# Patient Record
Sex: MALE | Race: WHITE | HISPANIC OR LATINO | URBAN - METROPOLITAN AREA
[De-identification: names, ages, dates, MRNs, and addresses within clinical notes are randomized per-mention and may not be internally consistent; named-entity substitution may affect disease eponyms.]

---

## 2023-08-14 ENCOUNTER — HOSPITAL ENCOUNTER (EMERGENCY)
Facility: HOSPITAL | Age: 33
Discharge: HOME | End: 2023-08-14
Attending: EMERGENCY MEDICINE | Admitting: EMERGENCY MEDICINE

## 2023-08-14 ENCOUNTER — APPOINTMENT (EMERGENCY)
Dept: RADIOLOGY | Facility: HOSPITAL | Age: 33
End: 2023-08-14

## 2023-08-14 VITALS
HEART RATE: 62 BPM | TEMPERATURE: 97.4 F | OXYGEN SATURATION: 98 % | RESPIRATION RATE: 18 BRPM | DIASTOLIC BLOOD PRESSURE: 90 MMHG | SYSTOLIC BLOOD PRESSURE: 144 MMHG

## 2023-08-14 DIAGNOSIS — R07.89 CHEST WALL PAIN: ICD-10-CM

## 2023-08-14 DIAGNOSIS — G56.22 CUBITAL TUNNEL SYNDROME ON LEFT: Primary | ICD-10-CM

## 2023-08-14 LAB
ALBUMIN SERPL BCP-MCNC: 3.7 G/DL (ref 3.5–5)
ALP SERPL-CCNC: 105 U/L (ref 46–116)
ALT SERPL W P-5'-P-CCNC: 10 U/L (ref 12–78)
ANION GAP SERPL CALCULATED.3IONS-SCNC: 7 MMOL/L
AST SERPL W P-5'-P-CCNC: 16 U/L (ref 5–45)
BASOPHILS # BLD AUTO: 0.05 THOUSANDS/ÂΜL (ref 0–0.1)
BASOPHILS NFR BLD AUTO: 1 % (ref 0–1)
BILIRUB SERPL-MCNC: 0.76 MG/DL (ref 0.2–1)
BUN SERPL-MCNC: 12 MG/DL (ref 5–25)
CALCIUM SERPL-MCNC: 9.8 MG/DL (ref 8.3–10.1)
CARDIAC TROPONIN I PNL SERPL HS: <2 NG/L
CHLORIDE SERPL-SCNC: 104 MMOL/L (ref 96–108)
CO2 SERPL-SCNC: 28 MMOL/L (ref 21–32)
CREAT SERPL-MCNC: 0.98 MG/DL (ref 0.6–1.3)
EOSINOPHIL # BLD AUTO: 0.15 THOUSAND/ÂΜL (ref 0–0.61)
EOSINOPHIL NFR BLD AUTO: 2 % (ref 0–6)
ERYTHROCYTE [DISTWIDTH] IN BLOOD BY AUTOMATED COUNT: 12.6 % (ref 11.6–15.1)
GFR SERPL CREATININE-BSD FRML MDRD: 100 ML/MIN/1.73SQ M
GLUCOSE SERPL-MCNC: 142 MG/DL (ref 65–140)
HCT VFR BLD AUTO: 49 % (ref 36.5–49.3)
HGB BLD-MCNC: 16.3 G/DL (ref 12–17)
IMM GRANULOCYTES # BLD AUTO: 0.04 THOUSAND/UL (ref 0–0.2)
IMM GRANULOCYTES NFR BLD AUTO: 0 % (ref 0–2)
LYMPHOCYTES # BLD AUTO: 2.23 THOUSANDS/ÂΜL (ref 0.6–4.47)
LYMPHOCYTES NFR BLD AUTO: 23 % (ref 14–44)
MCH RBC QN AUTO: 29.8 PG (ref 26.8–34.3)
MCHC RBC AUTO-ENTMCNC: 33.3 G/DL (ref 31.4–37.4)
MCV RBC AUTO: 90 FL (ref 82–98)
MONOCYTES # BLD AUTO: 0.92 THOUSAND/ÂΜL (ref 0.17–1.22)
MONOCYTES NFR BLD AUTO: 10 % (ref 4–12)
NEUTROPHILS # BLD AUTO: 6.33 THOUSANDS/ÂΜL (ref 1.85–7.62)
NEUTS SEG NFR BLD AUTO: 64 % (ref 43–75)
NRBC BLD AUTO-RTO: 0 /100 WBCS
PLATELET # BLD AUTO: 261 THOUSANDS/UL (ref 149–390)
PMV BLD AUTO: 11.3 FL (ref 8.9–12.7)
POTASSIUM SERPL-SCNC: 4.2 MMOL/L (ref 3.5–5.3)
PROT SERPL-MCNC: 8.2 G/DL (ref 6.4–8.4)
RBC # BLD AUTO: 5.47 MILLION/UL (ref 3.88–5.62)
SODIUM SERPL-SCNC: 139 MMOL/L (ref 135–147)
WBC # BLD AUTO: 9.72 THOUSAND/UL (ref 4.31–10.16)

## 2023-08-14 PROCEDURE — 84484 ASSAY OF TROPONIN QUANT: CPT | Performed by: EMERGENCY MEDICINE

## 2023-08-14 PROCEDURE — 99285 EMERGENCY DEPT VISIT HI MDM: CPT | Performed by: EMERGENCY MEDICINE

## 2023-08-14 PROCEDURE — 36415 COLL VENOUS BLD VENIPUNCTURE: CPT

## 2023-08-14 PROCEDURE — 93005 ELECTROCARDIOGRAM TRACING: CPT

## 2023-08-14 PROCEDURE — 99285 EMERGENCY DEPT VISIT HI MDM: CPT

## 2023-08-14 PROCEDURE — 85025 COMPLETE CBC W/AUTO DIFF WBC: CPT | Performed by: EMERGENCY MEDICINE

## 2023-08-14 PROCEDURE — 71045 X-RAY EXAM CHEST 1 VIEW: CPT

## 2023-08-14 PROCEDURE — 80053 COMPREHEN METABOLIC PANEL: CPT | Performed by: EMERGENCY MEDICINE

## 2023-08-15 LAB
ATRIAL RATE: 60 BPM
P AXIS: 55 DEGREES
PR INTERVAL: 136 MS
QRS AXIS: 67 DEGREES
QRSD INTERVAL: 94 MS
QT INTERVAL: 420 MS
QTC INTERVAL: 420 MS
T WAVE AXIS: 59 DEGREES
VENTRICULAR RATE: 60 BPM

## 2023-08-15 PROCEDURE — 93010 ELECTROCARDIOGRAM REPORT: CPT | Performed by: INTERNAL MEDICINE

## 2023-08-15 NOTE — DISCHARGE INSTRUCTIONS
Please control pain with NSAIDs. Please follow-up with physical therapy. Please wrap your elbow at night and try to keep it straight.

## 2023-08-15 NOTE — ED ATTENDING ATTESTATION
8/14/2023  IDavion DO, saw and evaluated the patient. I have discussed the patient with the resident/non-physician practitioner and agree with the resident's/non-physician practitioner's findings, Plan of Care, and MDM as documented in the resident's/non-physician practitioner's note, except where noted. All available labs and Radiology studies were reviewed. I was present for key portions of any procedure(s) performed by the resident/non-physician practitioner and I was immediately available to provide assistance. At this point I agree with the current assessment done in the Emergency Department. I have conducted an independent evaluation of this patient a history and physical is as follows:    34 yo male c/o:  1) chest pain 1 year. Sharp L sided pain. Intermittent. No a/e factors. Non radiating. Non exertional. Does not have any associated f/c/n/v, dyspnea, diaphoresis  2) L calf 2 weeks. No recent trauma or injury. Sharp, L posterior calf. Intermittent. No a/e factors. No focal weakness/numbness/tingling. 3) L little finger and ring finger paresthesias x 2 days. Has hx of similar mostly when he wakes up. Usually goes away. Denies weakness, sensory changes, just the tingling. No hx DVT/PE, recent travel, immobilization, surgery or trauma. Wells DVT -2    Imp: 1) non cardiac chest pain. Likely MSK. 2) L calf pain likely not DVT, prob MSK As well. 3) likely cubital tunnel syndrome/L ulnar nerve entrapment plan: ECG. NSAIDs. Wrap L elbow at night.  Refer to PT for likely cubital tunnel syndrome    ED Course         Critical Care Time  Procedures

## 2023-08-15 NOTE — ED PROVIDER NOTES
History  Chief Complaint   Patient presents with   • Chest Pain     Pt states that starting Saturday he has been experiencing SOB and chest pain with numbness in his left 4th and 5th digit. Also states he has pain in his left leg. Denies long periods of sitting. Patient is a 63-year-old male with no significant past medical history presenting with left pinky and ring finger tingling for 2 days. He also states that he has had left-sided chest pain for a year and left-sided calf pain for 2 weeks. He states that the tingling in his fingers started 2 days ago and has not changed at all. He denies any weakness or loss of sensation in that hand or arm. He states that the left-sided chest pain is sharp and is worse with palpation, but it comes and goes without any specific cause. His left-sided calf pain is also a sharp pain that comes and goes. He does not note any trauma or activity that could have caused a muscle strain prior to this. He has no history of blood clots or recent surgery/bedridden/travel or active cancer. He denies headache, lightheadedness, shortness of breath, abdominal pain, change in bowel movements, urinary symptoms, numbness, or weakness. None       History reviewed. No pertinent past medical history. History reviewed. No pertinent surgical history. History reviewed. No pertinent family history. I have reviewed and agree with the history as documented.     E-Cigarette/Vaping     E-Cigarette/Vaping Substances           Review of Systems    Physical Exam  ED Triage Vitals   Temperature Pulse Respirations Blood Pressure SpO2   08/14/23 1814 08/14/23 1814 08/14/23 1814 08/14/23 1814 08/14/23 1814   (!) 97.4 °F (36.3 °C) 62 18 144/90 98 %      Temp Source Heart Rate Source Patient Position - Orthostatic VS BP Location FiO2 (%)   08/14/23 1814 08/14/23 1814 -- -- --   Temporal Monitor         Pain Score       08/14/23 1820       10 - Worst Possible Pain             Orthostatic Vital Signs  Vitals:    08/14/23 1814   BP: 144/90   Pulse: 62       Physical Exam  Vitals and nursing note reviewed. Constitutional:       Appearance: He is well-developed. HENT:      Head: Normocephalic and atraumatic. Cardiovascular:      Rate and Rhythm: Normal rate and regular rhythm. Heart sounds: Normal heart sounds. Pulmonary:      Effort: Pulmonary effort is normal.      Breath sounds: Normal breath sounds. Chest:      Chest wall: Tenderness present. Abdominal:      Palpations: Abdomen is soft. Tenderness: There is no abdominal tenderness. Musculoskeletal:         General: Normal range of motion. Right hand: Normal. No tenderness. Normal strength. Normal sensation. Normal pulse. Left hand: Normal. No tenderness. Normal strength. Normal sensation. Normal pulse. Skin:     General: Skin is warm and dry. Neurological:      General: No focal deficit present. Mental Status: He is alert and oriented to person, place, and time. Motor: No weakness.          ED Medications  Medications - No data to display    Diagnostic Studies  Results Reviewed     Procedure Component Value Units Date/Time    Comprehensive metabolic panel [[id number]]  (Abnormal) Collected: 08/14/23 1838    Lab Status: Final result Specimen: Blood from Arm, Left Updated: 08/14/23 1927     Sodium 139 mmol/L      Potassium 4.2 mmol/L      Chloride 104 mmol/L      CO2 28 mmol/L      ANION GAP 7 mmol/L      BUN 12 mg/dL      Creatinine 0.98 mg/dL      Glucose 142 mg/dL      Calcium 9.8 mg/dL      AST 16 U/L      ALT 10 U/L      Alkaline Phosphatase 105 U/L      Total Protein 8.2 g/dL      Albumin 3.7 g/dL      Total Bilirubin 0.76 mg/dL      eGFR 100 ml/min/1.73sq m     Narrative:      [place] guidelines for Chronic Kidney Disease (CKD):   •  Stage 1 with normal or high GFR (GFR > 90 mL/min/1.73 square meters)  •  Stage 2 Mild CKD (GFR = 60-89 mL/min/1.73 square meters)  •  Stage 3A Moderate CKD (GFR = 45-59 mL/min/1.73 square meters)  •  Stage 3B Moderate CKD (GFR = 30-44 mL/min/1.73 square meters)  •  Stage 4 Severe CKD (GFR = 15-29 mL/min/1.73 square meters)  •  Stage 5 End Stage CKD (GFR <15 mL/min/1.73 square meters)  Note: GFR calculation is accurate only with a steady state creatinine    HS Troponin 0hr (reflex protocol) [[id number]]  (Normal) Collected: 08/14/23 1838    Lab Status: Final result Specimen: Blood from Arm, Left Updated: 08/14/23 1912     hs TnI 0hr <2 ng/L     CBC and differential [[id number]] Collected: 08/14/23 1838    Lab Status: Final result Specimen: Blood from Arm, Left Updated: 08/14/23 1859     WBC 9.72 Thousand/uL      RBC 5.47 Million/uL      Hemoglobin 16.3 g/dL      Hematocrit 49.0 %      MCV 90 fL      MCH 29.8 pg      MCHC 33.3 g/dL      RDW 12.6 %      MPV 11.3 fL      Platelets 030 Thousands/uL      nRBC 0 /100 WBCs      Neutrophils Relative 64 %      Immat GRANS % 0 %      Lymphocytes Relative 23 %      Monocytes Relative 10 %      Eosinophils Relative 2 %      Basophils Relative 1 %      Neutrophils Absolute 6.33 Thousands/µL      Immature Grans Absolute 0.04 Thousand/uL      Lymphocytes Absolute 2.23 Thousands/µL      Monocytes Absolute 0.92 Thousand/µL      Eosinophils Absolute 0.15 Thousand/µL      Basophils Absolute 0.05 Thousands/µL                  XR chest 1 view portable   ED Interpretation by Juliano Comer MD (08/14 2059)   No acute cardiopulmonary abnormality            Procedures  Procedures      ED Course                                       Medical Decision Making  Patient is a 42-year-old male presenting with left-sided pinky and ring finger tingling, chest pain, and left calf pain. Patient symptoms all started at different times and do not seem clinically related. Based on the clinical picture, left finger tingling seems like cubital tunnel syndrome.   Will be treated with Ace wrap and told to keep his arm straight at night while he sleeps, and to follow-up with PT. He was told to continue using NSAIDs for pain and inflammation. Chest pain differential includes ACS versus musculoskeletal.  Cardiac work-up shows normal EKG, normal troponin, and normal chest x-ray. Patient's calf pain differential includes DVT versus muscle strain. Wells DVT score is -2, ruling this out. Most likely cause is musculoskeletal.  He is tender to palpation on the gastrocnemius muscle. Amount and/or Complexity of Data Reviewed  Labs: ordered. Radiology: ordered and independent interpretation performed. Disposition  Final diagnoses:   Cubital tunnel syndrome on left   Chest wall pain     Time reflects when diagnosis was documented in both MDM as applicable and the Disposition within this note     Time User Action Codes Description Comment    8/14/2023  8:50 PM Arely Christiansen Add [G56.20] Cubital tunnel syndrome     8/14/2023  8:50 PM Arely Christiansen Remove [G56.20] Cubital tunnel syndrome     8/14/2023  8:50 PM Arely Christiansen Add [G56.22] Cubital tunnel syndrome on left     8/14/2023  8:50 PM Arely Christiansen Add [R07.89] Chest wall pain       ED Disposition     ED Disposition   Discharge    Condition   Stable    Date/Time   Mon Aug 14, 2023  8:51 PM    Comment   Susannah Singleton discharge to home/self care. Follow-up Information     Follow up With Specialties Details Why Contact Info Additional Information    Physical Therapy at [facility]   [facility] [address]  [phone] Physical Therapy at [address] [place] [address], [address]          There are no discharge medications for this patient. PDMP Review     None           ED Provider  Attending physically available and evaluated Susannah Singleton. I managed the patient along with the ED Attending.     Electronically Signed by         Thaddeus Hook MD  08/14/23 7511